# Patient Record
Sex: FEMALE | Race: WHITE | NOT HISPANIC OR LATINO | Employment: OTHER | URBAN - METROPOLITAN AREA
[De-identification: names, ages, dates, MRNs, and addresses within clinical notes are randomized per-mention and may not be internally consistent; named-entity substitution may affect disease eponyms.]

---

## 2021-08-04 ENCOUNTER — TELEPHONE (OUTPATIENT)
Dept: OBGYN CLINIC | Facility: CLINIC | Age: 71
End: 2021-08-04

## 2021-08-04 DIAGNOSIS — Z46.89 PESSARY MAINTENANCE: Primary | ICD-10-CM

## 2021-08-04 RX ORDER — OXYQUINOLINE/BORIC ACID 0.025 %
1 JELLY WITH APPLICATOR (GRAM) VAGINAL AS NEEDED
Qty: 113.4 G | Refills: 0 | Status: SHIPPED | OUTPATIENT
Start: 2021-08-04 | End: 2022-02-11

## 2021-08-04 NOTE — TELEPHONE ENCOUNTER
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

## 2021-08-04 NOTE — TELEPHONE ENCOUNTER
rx sent to patient's pharmacy on file [facility] in [facility])    Yudi Song MD  8/4/2021 12:52 PM

## 2021-09-02 ENCOUNTER — VBI (OUTPATIENT)
Dept: ADMINISTRATIVE | Facility: OTHER | Age: 71
End: 2021-09-02

## 2021-09-02 NOTE — TELEPHONE ENCOUNTER
Upon review of the documents received we were able to locate, review, and update the patient chart as requested for CRC: Colonoscopy, DEXA Scan, Mammogram and Pap Smear (HPV) aka Cervical Cancer Screening  Any additional questions or concerns should be emailed to the Practice Liaisons via [email or web address] email, please do not reply via In Basket      Thank you  Jeyson Baxter

## 2021-09-20 ENCOUNTER — ANNUAL EXAM (OUTPATIENT)
Dept: OBGYN CLINIC | Facility: CLINIC | Age: 71
End: 2021-09-20
Payer: MEDICARE

## 2021-09-20 VITALS
BODY MASS INDEX: 29.59 KG/M2 | WEIGHT: 167 LBS | DIASTOLIC BLOOD PRESSURE: 72 MMHG | SYSTOLIC BLOOD PRESSURE: 112 MMHG | HEIGHT: 63 IN

## 2021-09-20 DIAGNOSIS — Z01.419 ROUTINE GYNECOLOGICAL EXAMINATION: Primary | ICD-10-CM

## 2021-09-20 DIAGNOSIS — Z46.89 PESSARY MAINTENANCE: ICD-10-CM

## 2021-09-20 DIAGNOSIS — Z13.820 OSTEOPOROSIS SCREENING: ICD-10-CM

## 2021-09-20 DIAGNOSIS — E28.39 ESTROGEN DEFICIENCY: ICD-10-CM

## 2021-09-20 DIAGNOSIS — Z12.31 ENCOUNTER FOR SCREENING MAMMOGRAM FOR MALIGNANT NEOPLASM OF BREAST: ICD-10-CM

## 2021-09-20 PROCEDURE — G0101 CA SCREEN;PELVIC/BREAST EXAM: HCPCS | Performed by: OBSTETRICS & GYNECOLOGY

## 2021-09-20 RX ORDER — LEVOTHYROXINE SODIUM 75 UG/1
TABLET ORAL
COMMUNITY
Start: 2021-09-04

## 2021-09-20 RX ORDER — AMPICILLIN TRIHYDRATE 250 MG
CAPSULE ORAL
COMMUNITY

## 2021-09-20 RX ORDER — ESCITALOPRAM OXALATE 10 MG/1
TABLET ORAL
COMMUNITY
Start: 2021-08-31

## 2021-09-20 RX ORDER — GLUCOSAMINE/CHONDR SU A SOD 750-600 MG
TABLET ORAL EVERY 24 HOURS
COMMUNITY

## 2021-09-20 NOTE — PROGRESS NOTES
[address], [address], [facility], [address] [facility]    ASSESSMENT/PLAN: Kim Guajardo is a 70 y o  Madeleine Camejo who presents for annual gynecologic exam     Encounter for routine gynecologic examination  - Routine well woman exam completed today  - Cervical Cancer Screening: Current ASCCP Guidelines reviewed  Last Pap: 07/12/2019   Next Pap Due: none  - COVID vaccine : none   - Breast Cancer Screening: Last Mammogram 07/10/2020,   - Colorectal cancer screening  Pt states she is not  Due    - The following were reviewed in today's visit: menopause, osteoporosis, adequate intake of calcium and vitamin D, exercise, healthy diet and colonoscopy discussed     Additional problems addressed during this visit:  1  Routine gynecological examination  Comments:  no bleeding  bloating or satiety  2  Encounter for screening mammogram for malignant neoplasm of breast  -     Mammo screening bilateral w 3d & cad; Future    3  BMI 20 0-20 9, adult    4  Estrogen deficiency  Comments:  prior use of Fosamax    Orders:  -     DXA bone density spine hip and pelvis; Future    5  Osteoporosis screening  Comments:  + fall  w left  ankle  frx ,  father with hip fx at  80   Orders:  -     DXA bone density spine hip and pelvis; Future    6  Pessary maintenance        CC:  Annual Gynecologic Examination    HPI: Kim Guajardo is a 70 y o  Madeleine Camejo who presents for annual gynecologic examination  69 yo here for  Wellness   Uses pessary daily  And self cares  The following portions of the patient's history were reviewed and updated as appropriate: She  has a past medical history of Papanicolaou smear (07/12/2019) and Presence of pessary  She  has a past surgical history that includes Mammo (historical) (07/13/2020); Colonoscopy (09/02/2015); DXA procedure(historical) (12/11/2017); Hernia repair (2007 & 2010 ); Squamous cell carcinoma excision (Left, 2007);  Tonsillectomy (1962); and DXA procedure(historical) (04/10/2015)  Her family history is not on file  She  reports that she has quit smoking  She has never used smokeless tobacco  She reports current alcohol use  She reports that she does not use drugs  Current Outpatient Medications   Medication Sig Dispense Refill    Calcium Carb-Cholecalciferol (Calcium 600+D3) 600-200 MG-UNIT TABS every 24 hours      escitalopram (LEXAPRO) 10 mg tablet TAKE 1 & 1 2 (ONE & ONE HALF) TABLETS BY MOUTH ONCE DAILY      Euthyrox 75 MCG tablet TAKE 1 TABLET BY MOUTH ONCE DAILY IN THE MORNING ON AN EMPTY STOMACH      OXYQUINOLONE SULFATE VAGINAL (Trimo-Paulino) 0 025 % GEL Insert 1 application into the vagina as needed (Pessary cleaning) 113 4 g 0    Red Yeast Rice 600 MG CAPS as directed       No current facility-administered medications for this visit  She is allergic to cefazolin and metronidazole       Review of Systems:  All systems normal except as noted in HPI          Objective:  /72 (BP Location: Left arm, Patient Position: Sitting, Cuff Size: Standard)   Ht 5' 3" (1 6 m)   Wt 75 8 kg (167 lb)   BMI 29 58 kg/m²    Physical Exam  Vitals and nursing note reviewed  Constitutional:       Appearance: Normal appearance  HENT:      Head: Normocephalic  Cardiovascular:      Rate and Rhythm: Normal rate and regular rhythm  Pulses: Normal pulses  Heart sounds: Normal heart sounds  Pulmonary:      Effort: Pulmonary effort is normal       Breath sounds: Normal breath sounds  Chest:      Chest wall: No mass, lacerations, swelling, tenderness or edema  Breasts: Yossi Score is 4  Breasts are symmetrical          Right: Normal  No swelling, bleeding, inverted nipple, mass, nipple discharge, skin change or tenderness  Left: No swelling, bleeding, inverted nipple, mass, nipple discharge, skin change or tenderness  Abdominal:      General: Abdomen is flat  Bowel sounds are normal       Palpations: Abdomen is soft     Genitourinary:     General: Normal vulva  Exam position: Lithotomy position  Pubic Area: No rash  Yossi stage (genital): 4       Labia:         Right: No rash, tenderness or lesion  Left: No rash, tenderness or lesion  Urethra: No urethral pain, urethral swelling or urethral lesion  Vagina: Normal       Cervix: No cervical motion tenderness or discharge  Uterus: Normal        Adnexa: Right adnexa normal and left adnexa normal       Rectum: Normal    Musculoskeletal:         General: Normal range of motion  Cervical back: Neck supple  Lymphadenopathy:      Upper Body:      Right upper body: No supraclavicular, axillary or pectoral adenopathy  Left upper body: No supraclavicular, axillary or pectoral adenopathy  Lower Body: No right inguinal adenopathy  No left inguinal adenopathy  Skin:     General: Skin is warm and dry  Neurological:      General: No focal deficit present  Mental Status: She is alert and oriented to person, place, and time     Psychiatric:         Mood and Affect: Mood normal          Behavior: Behavior normal

## 2021-10-06 DIAGNOSIS — E28.39 ESTROGEN DEFICIENCY: ICD-10-CM

## 2021-10-06 DIAGNOSIS — Z13.820 OSTEOPOROSIS SCREENING: ICD-10-CM

## 2022-02-10 DIAGNOSIS — Z46.89 PESSARY MAINTENANCE: ICD-10-CM

## 2022-02-11 RX ORDER — OXYQUINOLINE SULFATE AND SODIUM LAURYL SULFATE .25; .1 MG/G; MG/G
JELLY VAGINAL
Qty: 114 G | Refills: 0 | Status: SHIPPED | OUTPATIENT
Start: 2022-02-11

## 2022-08-24 DIAGNOSIS — Z46.89 PESSARY MAINTENANCE: ICD-10-CM

## 2022-08-24 RX ORDER — OXYQUINOLINE SULFATE AND SODIUM LAURYL SULFATE .25; .1 MG/G; MG/G
JELLY VAGINAL
Qty: 114 G | Refills: 0 | Status: SHIPPED | OUTPATIENT
Start: 2022-08-24

## 2023-04-22 DIAGNOSIS — Z46.89 PESSARY MAINTENANCE: ICD-10-CM

## 2023-04-23 RX ORDER — OXYQUINOLINE SULFATE AND SODIUM LAURYL SULFATE .25; .1 MG/G; MG/G
JELLY VAGINAL
Qty: 114 G | Refills: 0 | Status: SHIPPED | OUTPATIENT
Start: 2023-04-23

## 2025-04-22 ENCOUNTER — OFFICE VISIT (OUTPATIENT)
Dept: OBGYN CLINIC | Facility: CLINIC | Age: 75
End: 2025-04-22
Payer: MEDICARE

## 2025-04-22 VITALS
WEIGHT: 173 LBS | DIASTOLIC BLOOD PRESSURE: 74 MMHG | SYSTOLIC BLOOD PRESSURE: 114 MMHG | BODY MASS INDEX: 30.65 KG/M2 | HEIGHT: 63 IN

## 2025-04-22 DIAGNOSIS — Z12.31 OTHER SCREENING MAMMOGRAM: ICD-10-CM

## 2025-04-22 DIAGNOSIS — N81.89 PELVIC FLOOR RELAXATION: ICD-10-CM

## 2025-04-22 DIAGNOSIS — Z01.411 ENCOUNTER FOR GYNECOLOGICAL EXAMINATION WITH ABNORMAL FINDING: Primary | ICD-10-CM

## 2025-04-22 DIAGNOSIS — N95.2 ATROPHIC VAGINITIS: ICD-10-CM

## 2025-04-22 DIAGNOSIS — Z78.0 POSTMENOPAUSAL: ICD-10-CM

## 2025-04-22 PROBLEM — Z01.419 ENCOUNTER FOR GYNECOLOGICAL EXAMINATION WITHOUT ABNORMAL FINDING: Status: ACTIVE | Noted: 2025-04-22

## 2025-04-22 PROCEDURE — G0101 CA SCREEN;PELVIC/BREAST EXAM: HCPCS | Performed by: OBSTETRICS & GYNECOLOGY

## 2025-04-22 RX ORDER — VITAMIN K2 90 MCG
CAPSULE ORAL
COMMUNITY

## 2025-04-22 RX ORDER — ESTRADIOL 0.1 MG/G
1 CREAM VAGINAL 3 TIMES WEEKLY
Qty: 42.5 G | Refills: 2 | Status: SHIPPED | OUTPATIENT
Start: 2025-04-23

## 2025-04-22 RX ORDER — AMOXICILLIN 250 MG
CAPSULE ORAL
COMMUNITY

## 2025-04-22 RX ORDER — TURMERIC/TURMERIC EXT/PEPR EXT 900-100 MG
CAPSULE ORAL
COMMUNITY

## 2025-04-22 RX ORDER — MULTIVIT-MIN/FA/LYCOPEN/LUTEIN .4-300-25
TABLET ORAL
COMMUNITY

## 2025-04-22 RX ORDER — ESCITALOPRAM OXALATE 20 MG/1
1 TABLET ORAL DAILY
COMMUNITY
Start: 2025-02-20

## 2025-04-22 NOTE — PATIENT INSTRUCTIONS
Calcium 1200-1500mg + 800-1000 IU Vit D daily unless otherwise directed. Avoid falls. Exercise 150 minutes per week minimum including weight bearing exercises. DEXA scan-       . No further paps after age 65 as long as there has been adequate normal paps completed, no history of MONICA 2  or a more severe pap diagnosis in the last 20 years.   Annual mammogram and monthly breast self exam recommended .   Colonoscopy-        .  Kegels 20 times twice daily. Silicone based lubricant with sex. Vaginal moisturizers twice weekly as needed.

## 2025-04-22 NOTE — PROGRESS NOTES
[facility] - [address], [address], [place] [address]    ASSESSMENT/PLAN: Amrita Paz is a 75 y.o.  who presents for annual gynecologic exam.    Encounter for routine gynecologic examination  - Routine well woman exam completed today.  - Cervical Cancer Screening: Current ASCCP Guidelines reviewed. Last Pap: 2021 . Next Pap Due: over age  65   - Breast Cancer Screening: Last Mammogram 10/04/2021,   - Colorectal cancer screening was not ordered.  - The following were reviewed in today's visit: breast self exam, mammography screening ordered, menopause, adequate intake of calcium and vitamin D, exercise, healthy diet, and colonoscopy discussed and ordered    Additional problems addressed during this visit:  1. Encounter for gynecological examination with abnormal finding  2. Other screening mammogram  -     Mammo screening bilateral w 3d and cad; Future  3. Postmenopausal  -     estradiol (ESTRACE VAGINAL) 0.1 mg/g vaginal cream; Insert 1 g into the vagina 3 (three) times a week  4. Pelvic floor relaxation  Comments:  #4 dish w  knob  placed w movement.  Dw pt   pessary vs  surgical consult  for  hysterectomy .  2-3 weeks probable  recovery. Start estrace cream  1 gm 3 x wkly  Orders:  -     estradiol (ESTRACE VAGINAL) 0.1 mg/g vaginal cream; Insert 1 g into the vagina 3 (three) times a week  5. Atrophic vaginitis  -     estradiol (ESTRACE VAGINAL) 0.1 mg/g vaginal cream; Insert 1 g into the vagina 3 (three) times a week  Pt w self care  # 5  [place] for years.  Can not get  back in now.   No bleeding  pressure awareness that itit there     CC:  Annual Gynecologic Examination    HPI: Amrita Paz is a 75 y.o.  who presents for annual gynecologic examination.  74 yo here for  wellness  exam.  + self care  w  pessary and  can not  get it back in . + has to adjust to empty bowels.   Primary orders mammogram and  dexa scan.  + some leaking    +  pressure        The following portions of  the patient's history were reviewed and updated as appropriate: She  has a past medical history of Arthritis, Papanicolaou smear (07/12/2019), and Presence of pessary.  She  has a past surgical history that includes Mammo (historical) (07/13/2020); Colonoscopy (09/02/2015); DXA procedure(historical) (12/11/2017); Hernia repair (2007 & 2010 ); Squamous cell carcinoma excision (Left, 2007); Tonsillectomy (1962); and DXA procedure(historical) (04/10/2015).  Her family history includes Cancer in her father; Diabetes in her father; Heart attack in her father; Heart disease in her father; Lung disease in her mother; Migraines in her daughter, daughter, daughter, and son; Osteoarthritis in her mother; Thyroid disease in her mother.  She  reports that she quit smoking about 43 years ago. Her smoking use included cigarettes. She started smoking about 48 years ago. She has a 15 pack-year smoking history. She has never been exposed to tobacco smoke. She has never used smokeless tobacco. She reports current alcohol use of about 4.0 standard drinks of alcohol per week. She reports that she does not use drugs.  Current Outpatient Medications   Medication Sig Dispense Refill   • Calcium Carb-Cholecalciferol (Calcium 600+D3) 600-200 MG-UNIT TABS every 24 hours     • Cholecalciferol (Vitamin D3) 1000 units CAPS      • Cobalamin Combinations (B-12) 100-5000 MCG SUBL      • Coenzyme Q10 (CoQ-10) 100 MG CAPS      • escitalopram (LEXAPRO) 10 mg tablet      • escitalopram (LEXAPRO) 20 mg tablet Take 1 tablet by mouth in the morning     • [START ON 4/23/2025] estradiol (ESTRACE VAGINAL) 0.1 mg/g vaginal cream Insert 1 g into the vagina 3 (three) times a week 42.5 g 2   • Euthyrox 75 MCG tablet TAKE 1 TABLET BY MOUTH ONCE DAILY IN THE MORNING ON AN EMPTY STOMACH     • Multiple Vitamins-Minerals (Centrum Silver Adult 50+) TABS      • Red Yeast Rice 600 MG CAPS as directed     • Trimo-Paulino 0.025-0.01 % GEL INSERT 1 APPLICATORFUL IN VAGINA  AS  "NEEDED 114 g 0   • Turmeric Curcumin 5-1000 MG CAPS        No current facility-administered medications for this visit.     She is allergic to cefazolin and metronidazole..    Review of Systems:  All systems normal except as noted in HPI          Objective:  /74 (BP Location: Left arm, Patient Position: Sitting, Cuff Size: Standard)   Ht 5' 3\" (1.6 m)   Wt 78.5 kg (173 lb)   BMI 30.65 kg/m²    Physical Exam  Vitals and nursing note reviewed.   Constitutional:       Appearance: Normal appearance.   HENT:      Head: Normocephalic.   Cardiovascular:      Rate and Rhythm: Normal rate and regular rhythm.      Pulses: Normal pulses.      Heart sounds: Normal heart sounds.   Pulmonary:      Effort: Pulmonary effort is normal.      Breath sounds: Normal breath sounds.   Chest:      Chest wall: No mass, lacerations, swelling, tenderness or edema.   Breasts:     Yossi Score is 4.      Breasts are symmetrical.      Right: Normal. No swelling, bleeding, inverted nipple, mass, nipple discharge, skin change or tenderness.      Left: No swelling, bleeding, inverted nipple, mass, nipple discharge, skin change or tenderness.   Abdominal:      General: Abdomen is flat. Bowel sounds are normal.      Palpations: Abdomen is soft.   Genitourinary:     General: Normal vulva.      Exam position: Lithotomy position.      Pubic Area: No rash.       Yossi stage (genital): 4.      Labia:         Right: No rash, tenderness or lesion.         Left: No rash, tenderness or lesion.       Urethra: No urethral pain, urethral swelling or urethral lesion.      Vagina: Normal.      Cervix: No cervical motion tenderness or discharge.      Uterus: Normal.       Adnexa: Right adnexa normal and left adnexa normal.      Rectum: Normal.          Comments: 1 ant  vaginal wall at  introitus  no excoriation noted  post  vaginal  wall -2   side walls   - 1    Musculoskeletal:         General: Normal range of motion.      Cervical back: Normal range of " motion and neck supple.   Lymphadenopathy:      Upper Body:      Right upper body: No supraclavicular, axillary or pectoral adenopathy.      Left upper body: No supraclavicular, axillary or pectoral adenopathy.      Lower Body: No right inguinal adenopathy. No left inguinal adenopathy.   Skin:     General: Skin is warm and dry.   Neurological:      General: No focal deficit present.      Mental Status: She is alert and oriented to person, place, and time.   Psychiatric:         Mood and Affect: Mood normal.         Behavior: Behavior normal.         Thought Content: Thought content normal.         Judgment: Judgment normal.

## 2025-05-20 ENCOUNTER — TELEPHONE (OUTPATIENT)
Age: 75
End: 2025-05-20

## 2025-05-20 ENCOUNTER — OFFICE VISIT (OUTPATIENT)
Dept: OBGYN CLINIC | Facility: CLINIC | Age: 75
End: 2025-05-20

## 2025-05-20 VITALS
SYSTOLIC BLOOD PRESSURE: 120 MMHG | BODY MASS INDEX: 30.65 KG/M2 | WEIGHT: 173 LBS | HEIGHT: 63 IN | DIASTOLIC BLOOD PRESSURE: 70 MMHG

## 2025-05-20 DIAGNOSIS — N81.2 CYSTOCELE WITH INCOMPLETE UTEROVAGINAL PROLAPSE: Primary | ICD-10-CM

## 2025-05-20 DIAGNOSIS — N81.89 PELVIC FLOOR RELAXATION: ICD-10-CM

## 2025-05-20 NOTE — TELEPHONE ENCOUNTER
FYI- Patient seen by Dr Devi today, was advised office will schedule pessary insertion once device is received.   Pt mentions they will be away through 6/4, returning 6/5 so will need to contact/ schedule after then.

## 2025-05-22 PROBLEM — Z12.31 OTHER SCREENING MAMMOGRAM: Status: RESOLVED | Noted: 2025-04-22 | Resolved: 2025-05-22

## 2025-05-22 PROBLEM — N81.2 CYSTOCELE WITH INCOMPLETE UTEROVAGINAL PROLAPSE: Status: ACTIVE | Noted: 2025-05-20

## 2025-05-22 PROBLEM — Z01.419 ENCOUNTER FOR GYNECOLOGICAL EXAMINATION WITHOUT ABNORMAL FINDING: Status: RESOLVED | Noted: 2025-04-22 | Resolved: 2025-05-22

## 2025-05-22 NOTE — ASSESSMENT & PLAN NOTE
Pt has a symptomatic cystocele approximately 3 cm through the introitus. There is some uterine descensus (grade 1) and no significant rectocele. She demonstrates strong levators. Discussed trying a different pessary vs surgical intervention. She is interested in trying another pessary and she was fitted for a #6 ring with support. There was good support and she feels comfortable. Will order and place when available. She may want to manage herself.

## 2025-05-22 NOTE — PROGRESS NOTES
"Name: Amrita Paz      : 1950      MRN: [id number]  Encounter Provider: Morris Devi MD  Encounter Date: 2025   Encounter department: [facility] [place]  :  Assessment & Plan  Cystocele with incomplete uterovaginal prolapse  Pt has a symptomatic cystocele approximately 3 cm through the introitus. There is some uterine descensus (grade 1) and no significant rectocele. She demonstrates strong levators. Discussed trying a different pessary vs surgical intervention. She is interested in trying another pessary and she was fitted for a #6 ring with support. There was good support and she feels comfortable. Will order and place when available. She may want to manage herself.            Pelvic floor relaxation             History of Present Illness   HPI  Amrita Paz is a 75 y.o. female who presents to discuss her prolapse. She has used a Nettles pessary previously but she started to have prolapse around the pessary.   History obtained from: patient    Review of Systems  Medications Ordered Prior to Encounter[1]   Social History[2]     Objective   /70 (BP Location: Right arm, Patient Position: Sitting, Cuff Size: Standard)   Ht 5' 3\" (1.6 m)   Wt 78.5 kg (173 lb)   BMI 30.65 kg/m²      Physical Exam  Vitals and nursing note reviewed. Exam conducted with a chaperone present.   Constitutional:       Appearance: Normal appearance.   HENT:      Head: Normocephalic.   Abdominal:      General: Abdomen is flat. There is no distension.      Palpations: Abdomen is soft. There is no mass.   Genitourinary:     General: Normal vulva.      Exam position: Lithotomy position.      Urethra: No prolapse.      Vagina: Prolapsed vaginal walls present.      Cervix: Normal.      Uterus: Normal.       Comments: Anterior wall 3 cm through the introitus at rest.     Neurological:      Mental Status: She is alert.                [1]   Current Outpatient Medications on File Prior to Visit   Medication Sig " Dispense Refill    Calcium Carb-Cholecalciferol (Calcium 600+D3) 600-200 MG-UNIT TABS every 24 hours      Cholecalciferol (Vitamin D3) 1000 units CAPS       Cobalamin Combinations (B-12) 100-5000 MCG SUBL       Coenzyme Q10 (CoQ-10) 100 MG CAPS       escitalopram (LEXAPRO) 10 mg tablet       escitalopram (LEXAPRO) 20 mg tablet Take 1 tablet by mouth in the morning      estradiol (ESTRACE VAGINAL) 0.1 mg/g vaginal cream Insert 1 g into the vagina 3 (three) times a week 42.5 g 2    Euthyrox 75 MCG tablet       Multiple Vitamins-Minerals (Centrum Silver Adult 50+) TABS       Red Yeast Rice 600 MG CAPS       Trimo-Paulino 0.025-0.01 % GEL INSERT 1 APPLICATORFUL IN VAGINA  AS NEEDED 114 g 0    Turmeric Curcumin 5-1000 MG CAPS        No current facility-administered medications on file prior to visit.   [2]   Social History  Tobacco Use    Smoking status: Former     Current packs/day: 0.00     Average packs/day: 1 pack/day for 15.0 years (15.0 ttl pk-yrs)     Types: Cigarettes     Start date: 1976     Quit date: 1981     Years since quittin.8     Passive exposure: Never    Smokeless tobacco: Never   Vaping Use    Vaping status: Never Used   Substance and Sexual Activity    Alcohol use: Yes     Alcohol/week: 4.0 standard drinks of alcohol     Types: 4 Glasses of wine per week     Comment: Socially on weekends (1-4/week)     Drug use: Never     Comment: No - As per [employer]     Sexual activity: Not Currently     Birth control/protection: Post-menopausal

## 2025-05-27 ENCOUNTER — TELEPHONE (OUTPATIENT)
Dept: OBGYN CLINIC | Facility: CLINIC | Age: 75
End: 2025-05-27

## 2025-05-27 NOTE — TELEPHONE ENCOUNTER
----- Message from Morris Devi MD sent at 5/22/2025  9:02 AM EDT -----  Regarding: Justin  Pt needs a #6 ring with support.Thanks

## 2025-05-30 ENCOUNTER — TELEPHONE (OUTPATIENT)
Dept: OBGYN CLINIC | Facility: CLINIC | Age: 75
End: 2025-05-30

## 2025-06-16 ENCOUNTER — TELEPHONE (OUTPATIENT)
Age: 75
End: 2025-06-16

## 2025-06-16 ENCOUNTER — OFFICE VISIT (OUTPATIENT)
Dept: OBGYN CLINIC | Facility: CLINIC | Age: 75
End: 2025-06-16
Payer: MEDICARE

## 2025-06-16 VITALS
SYSTOLIC BLOOD PRESSURE: 116 MMHG | WEIGHT: 175 LBS | DIASTOLIC BLOOD PRESSURE: 72 MMHG | BODY MASS INDEX: 31.01 KG/M2 | HEIGHT: 63 IN

## 2025-06-16 DIAGNOSIS — N81.2 CYSTOCELE WITH INCOMPLETE UTEROVAGINAL PROLAPSE: Primary | ICD-10-CM

## 2025-06-16 PROCEDURE — 57160 INSERT PESSARY/OTHER DEVICE: CPT | Performed by: OBSTETRICS & GYNECOLOGY

## 2025-06-16 PROCEDURE — A4562 PESSARY, NON RUBBER,ANY TYPE: HCPCS | Performed by: OBSTETRICS & GYNECOLOGY

## 2025-06-16 NOTE — PROGRESS NOTES
Pessary    Date/Time: 6/16/2025 2:30 PM    Performed by: Morris Devi MD  Authorized by: Morris Devi MD    Universal Protocol:  Consent: Verbal consent obtained  Risks and benefits: risks, benefits and alternatives were discussed  Patient understanding: patient states understanding of the procedure being performed    Indication:     Indication for pessary: cystocele    Pre-procedure:     Pessary procedure type:  Insertion  Problems:     Pessary complications: none    Procedure:     Pessary type:  Ring w/ support    Pessary size:  6    Patient tolerance of procedure:  Tolerated well, no immediate complications

## 2025-06-16 NOTE — TELEPHONE ENCOUNTER
Patient called in, had pessary fitting in office today and the pessary came out the first time she went to the bathroom after arriving home. No upcoming appointments available for reevaluation. Attempted warm transfer, provider unavailable to take call. Message left for provider to return call to patient.

## 2025-07-08 ENCOUNTER — OFFICE VISIT (OUTPATIENT)
Dept: OBGYN CLINIC | Facility: CLINIC | Age: 75
End: 2025-07-08
Payer: MEDICARE

## 2025-07-08 VITALS
DIASTOLIC BLOOD PRESSURE: 70 MMHG | BODY MASS INDEX: 30.83 KG/M2 | WEIGHT: 174 LBS | HEIGHT: 63 IN | SYSTOLIC BLOOD PRESSURE: 118 MMHG

## 2025-07-08 DIAGNOSIS — N81.89 PELVIC FLOOR RELAXATION: ICD-10-CM

## 2025-07-08 DIAGNOSIS — N81.2 CYSTOCELE WITH INCOMPLETE UTEROVAGINAL PROLAPSE: Primary | ICD-10-CM

## 2025-07-08 PROCEDURE — 99213 OFFICE O/P EST LOW 20 MIN: CPT | Performed by: OBSTETRICS & GYNECOLOGY
